# Patient Record
Sex: FEMALE | Race: BLACK OR AFRICAN AMERICAN | NOT HISPANIC OR LATINO | Employment: FULL TIME | URBAN - METROPOLITAN AREA
[De-identification: names, ages, dates, MRNs, and addresses within clinical notes are randomized per-mention and may not be internally consistent; named-entity substitution may affect disease eponyms.]

---

## 2024-05-08 ENCOUNTER — HOSPITAL ENCOUNTER (OUTPATIENT)
Facility: HOSPITAL | Age: 29
Discharge: HOME | End: 2024-05-08
Attending: EMERGENCY MEDICINE | Admitting: EMERGENCY MEDICINE
Payer: MEDICAID

## 2024-05-08 VITALS
TEMPERATURE: 98.1 F | HEIGHT: 67 IN | DIASTOLIC BLOOD PRESSURE: 88 MMHG | BODY MASS INDEX: 26.42 KG/M2 | RESPIRATION RATE: 20 BRPM | HEART RATE: 85 BPM | SYSTOLIC BLOOD PRESSURE: 148 MMHG | WEIGHT: 168.3 LBS | OXYGEN SATURATION: 100 %

## 2024-05-08 DIAGNOSIS — J06.9 VIRAL URI WITH COUGH: Primary | ICD-10-CM

## 2024-05-08 LAB
FLUAV SUBTYP SPEC NAA+PROBE: NOT DETECTED
FLUBV RNA ISLT QL NAA+PROBE: NOT DETECTED
SARS-COV-2 RNA RESP QL NAA+PROBE: NOT DETECTED
STREP A PCR: NOT DETECTED

## 2024-05-08 PROCEDURE — G0463 HOSPITAL OUTPT CLINIC VISIT: HCPCS

## 2024-05-08 PROCEDURE — 87636 SARSCOV2 & INF A&B AMP PRB: CPT | Performed by: EMERGENCY MEDICINE

## 2024-05-08 PROCEDURE — 87651 STREP A DNA AMP PROBE: CPT | Performed by: EMERGENCY MEDICINE

## 2024-05-08 PROCEDURE — 25010000002 DEXAMETHASONE PER 1 MG

## 2024-05-08 RX ORDER — BENZONATATE 100 MG/1
100 CAPSULE ORAL 3 TIMES DAILY PRN
Qty: 12 CAPSULE | Refills: 0 | Status: SHIPPED | OUTPATIENT
Start: 2024-05-08

## 2024-05-08 RX ADMIN — DEXAMETHASONE SODIUM PHOSPHATE 10 MG: 10 INJECTION, SOLUTION INTRAMUSCULAR; INTRAVENOUS at 15:53

## 2025-06-22 ENCOUNTER — HOSPITAL ENCOUNTER (EMERGENCY)
Facility: HOSPITAL | Age: 30
Discharge: HOME | End: 2025-06-23
Attending: EMERGENCY MEDICINE | Admitting: EMERGENCY MEDICINE
Payer: COMMERCIAL

## 2025-06-22 DIAGNOSIS — R10.9 ABDOMINAL PAIN IN PREGNANCY, SECOND TRIMESTER: Primary | ICD-10-CM

## 2025-06-22 DIAGNOSIS — O26.892 ABDOMINAL PAIN IN PREGNANCY, SECOND TRIMESTER: Primary | ICD-10-CM

## 2025-06-22 LAB
ALBUMIN SERPL-MCNC: 3.7 G/DL (ref 3.5–5.2)
ALBUMIN/GLOB SERPL: 1.2 G/DL
ALP SERPL-CCNC: 87 U/L (ref 39–117)
ALT SERPL W P-5'-P-CCNC: 10 U/L (ref 1–33)
ANION GAP SERPL CALCULATED.3IONS-SCNC: 10.8 MMOL/L (ref 5–15)
AST SERPL-CCNC: 12 U/L (ref 1–32)
BASOPHILS # BLD AUTO: 0.01 10*3/MM3 (ref 0–0.2)
BASOPHILS NFR BLD AUTO: 0.1 % (ref 0–1.5)
BILIRUB SERPL-MCNC: 0.2 MG/DL (ref 0–1.2)
BILIRUB UR QL STRIP: NEGATIVE
BUN SERPL-MCNC: 6.7 MG/DL (ref 6–20)
BUN/CREAT SERPL: 12 (ref 7–25)
CALCIUM SPEC-SCNC: 9 MG/DL (ref 8.6–10.5)
CHLORIDE SERPL-SCNC: 102 MMOL/L (ref 98–107)
CLARITY UR: CLEAR
CO2 SERPL-SCNC: 21.2 MMOL/L (ref 22–29)
COLOR UR: YELLOW
CREAT SERPL-MCNC: 0.56 MG/DL (ref 0.57–1)
DEPRECATED RDW RBC AUTO: 44.3 FL (ref 37–54)
EGFRCR SERPLBLD CKD-EPI 2021: 128.5 ML/MIN/1.73
EOSINOPHIL # BLD AUTO: 0.02 10*3/MM3 (ref 0–0.4)
EOSINOPHIL NFR BLD AUTO: 0.2 % (ref 0.3–6.2)
ERYTHROCYTE [DISTWIDTH] IN BLOOD BY AUTOMATED COUNT: 13.5 % (ref 12.3–15.4)
GLOBULIN UR ELPH-MCNC: 3.1 GM/DL
GLUCOSE SERPL-MCNC: 75 MG/DL (ref 65–99)
GLUCOSE UR STRIP-MCNC: NEGATIVE MG/DL
HCT VFR BLD AUTO: 35.5 % (ref 34–46.6)
HGB BLD-MCNC: 11.5 G/DL (ref 12–15.9)
HGB UR QL STRIP.AUTO: NEGATIVE
IMM GRANULOCYTES # BLD AUTO: 0.02 10*3/MM3 (ref 0–0.05)
IMM GRANULOCYTES NFR BLD AUTO: 0.2 % (ref 0–0.5)
KETONES UR QL STRIP: NEGATIVE
LEUKOCYTE ESTERASE UR QL STRIP.AUTO: NEGATIVE
LYMPHOCYTES # BLD AUTO: 1.35 10*3/MM3 (ref 0.7–3.1)
LYMPHOCYTES NFR BLD AUTO: 15.1 % (ref 19.6–45.3)
MCH RBC QN AUTO: 28.6 PG (ref 26.6–33)
MCHC RBC AUTO-ENTMCNC: 32.4 G/DL (ref 31.5–35.7)
MCV RBC AUTO: 88.3 FL (ref 79–97)
MONOCYTES # BLD AUTO: 0.74 10*3/MM3 (ref 0.1–0.9)
MONOCYTES NFR BLD AUTO: 8.3 % (ref 5–12)
NEUTROPHILS NFR BLD AUTO: 6.81 10*3/MM3 (ref 1.7–7)
NEUTROPHILS NFR BLD AUTO: 76.1 % (ref 42.7–76)
NITRITE UR QL STRIP: NEGATIVE
PH UR STRIP.AUTO: 7 [PH] (ref 5–8)
PLATELET # BLD AUTO: 242 10*3/MM3 (ref 140–450)
PMV BLD AUTO: 10.1 FL (ref 6–12)
POTASSIUM SERPL-SCNC: 3.5 MMOL/L (ref 3.5–5.2)
PROT SERPL-MCNC: 6.8 G/DL (ref 6–8.5)
PROT UR QL STRIP: NEGATIVE
RBC # BLD AUTO: 4.02 10*6/MM3 (ref 3.77–5.28)
SODIUM SERPL-SCNC: 134 MMOL/L (ref 136–145)
SP GR UR STRIP: 1.01 (ref 1–1.03)
UROBILINOGEN UR QL STRIP: NORMAL
WBC NRBC COR # BLD AUTO: 8.95 10*3/MM3 (ref 3.4–10.8)

## 2025-06-22 PROCEDURE — 25810000003 SODIUM CHLORIDE 0.9 % SOLUTION: Performed by: EMERGENCY MEDICINE

## 2025-06-22 PROCEDURE — 99284 EMERGENCY DEPT VISIT MOD MDM: CPT | Performed by: EMERGENCY MEDICINE

## 2025-06-22 PROCEDURE — 81003 URINALYSIS AUTO W/O SCOPE: CPT | Performed by: EMERGENCY MEDICINE

## 2025-06-22 PROCEDURE — 85025 COMPLETE CBC W/AUTO DIFF WBC: CPT | Performed by: EMERGENCY MEDICINE

## 2025-06-22 PROCEDURE — 80053 COMPREHEN METABOLIC PANEL: CPT | Performed by: EMERGENCY MEDICINE

## 2025-06-22 PROCEDURE — 99284 EMERGENCY DEPT VISIT MOD MDM: CPT

## 2025-06-22 RX ORDER — ACETAMINOPHEN 325 MG/1
650 TABLET ORAL ONCE
Status: DISCONTINUED | OUTPATIENT
Start: 2025-06-23 | End: 2025-06-23

## 2025-06-22 RX ADMIN — SODIUM CHLORIDE 1000 ML: 9 INJECTION, SOLUTION INTRAVENOUS at 23:45

## 2025-06-23 ENCOUNTER — HOSPITAL ENCOUNTER (OUTPATIENT)
Facility: HOSPITAL | Age: 30
Discharge: HOME | End: 2025-06-23
Attending: OBSTETRICS & GYNECOLOGY | Admitting: STUDENT IN AN ORGANIZED HEALTH CARE EDUCATION/TRAINING PROGRAM
Payer: COMMERCIAL

## 2025-06-23 ENCOUNTER — APPOINTMENT (OUTPATIENT)
Dept: ULTRASOUND IMAGING | Facility: HOSPITAL | Age: 30
End: 2025-06-23
Payer: COMMERCIAL

## 2025-06-23 VITALS
RESPIRATION RATE: 20 BRPM | WEIGHT: 181 LBS | DIASTOLIC BLOOD PRESSURE: 71 MMHG | HEART RATE: 78 BPM | OXYGEN SATURATION: 100 % | BODY MASS INDEX: 28.35 KG/M2 | SYSTOLIC BLOOD PRESSURE: 125 MMHG | TEMPERATURE: 98.2 F

## 2025-06-23 VITALS
HEIGHT: 67 IN | DIASTOLIC BLOOD PRESSURE: 68 MMHG | BODY MASS INDEX: 26.21 KG/M2 | TEMPERATURE: 98.5 F | WEIGHT: 167 LBS | OXYGEN SATURATION: 100 % | RESPIRATION RATE: 19 BRPM | HEART RATE: 107 BPM | SYSTOLIC BLOOD PRESSURE: 104 MMHG

## 2025-06-23 PROBLEM — Z34.90 PREGNANT: Status: ACTIVE | Noted: 2025-06-23

## 2025-06-23 LAB
BILIRUB UR QL STRIP: NEGATIVE
CLARITY UR: CLEAR
COLOR UR: YELLOW
GLUCOSE UR STRIP-MCNC: NEGATIVE MG/DL
HGB UR QL STRIP.AUTO: NEGATIVE
KETONES UR QL STRIP: NEGATIVE
LEUKOCYTE ESTERASE UR QL STRIP.AUTO: NEGATIVE
NITRITE UR QL STRIP: NEGATIVE
PH UR STRIP.AUTO: 6.5 [PH] (ref 5–8)
PROT UR QL STRIP: NEGATIVE
SP GR UR STRIP: 1.01 (ref 1–1.03)
UROBILINOGEN UR QL STRIP: NORMAL

## 2025-06-23 PROCEDURE — 96360 HYDRATION IV INFUSION INIT: CPT

## 2025-06-23 PROCEDURE — 76815 OB US LIMITED FETUS(S): CPT

## 2025-06-23 PROCEDURE — 87086 URINE CULTURE/COLONY COUNT: CPT | Performed by: STUDENT IN AN ORGANIZED HEALTH CARE EDUCATION/TRAINING PROGRAM

## 2025-06-23 PROCEDURE — 81003 URINALYSIS AUTO W/O SCOPE: CPT | Performed by: STUDENT IN AN ORGANIZED HEALTH CARE EDUCATION/TRAINING PROGRAM

## 2025-06-23 PROCEDURE — G0463 HOSPITAL OUTPT CLINIC VISIT: HCPCS

## 2025-06-23 RX ORDER — CYCLOBENZAPRINE HCL 10 MG
5 TABLET ORAL 3 TIMES DAILY
Status: DISCONTINUED | OUTPATIENT
Start: 2025-06-23 | End: 2025-06-23

## 2025-06-23 RX ORDER — ACETAMINOPHEN 500 MG
1000 TABLET ORAL ONCE
Status: COMPLETED | OUTPATIENT
Start: 2025-06-23 | End: 2025-06-23

## 2025-06-23 RX ORDER — PRENATAL VIT NO.126/IRON/FOLIC 28MG-0.8MG
1 TABLET ORAL DAILY
COMMUNITY

## 2025-06-23 RX ORDER — MULTIVIT WITH MINERALS/LUTEIN
250 TABLET ORAL DAILY
COMMUNITY

## 2025-06-23 RX ORDER — CYCLOBENZAPRINE HCL 10 MG
5 TABLET ORAL ONCE
Status: DISCONTINUED | OUTPATIENT
Start: 2025-06-23 | End: 2025-06-23

## 2025-06-23 RX ORDER — CYCLOBENZAPRINE HCL 10 MG
10 TABLET ORAL 3 TIMES DAILY
Status: DISCONTINUED | OUTPATIENT
Start: 2025-06-23 | End: 2025-06-23

## 2025-06-23 RX ADMIN — ACETAMINOPHEN 1000 MG: 500 TABLET, FILM COATED ORAL at 06:11

## 2025-06-23 RX ADMIN — CYCLOBENZAPRINE HYDROCHLORIDE 5 MG: 10 TABLET, FILM COATED ORAL at 07:38

## 2025-06-23 NOTE — DISCHARGE INSTRUCTIONS
Drink plenty of fluids. Take Tylenol for pain. Follow-up with Dr Ybarra this week. Return if problems.

## 2025-06-23 NOTE — FSED PROVIDER NOTE
Subjective   History of Present Illness  29 yof  who 22 weeks a long complains of lower abdominal pain, cramping and back pain that started earlier today. The patient denies bleeding or spotting. The patient denies dysuria, vomiting or diarrhea.       Review of Systems   Constitutional: Negative.    Gastrointestinal:  Positive for abdominal pain.   Genitourinary:  Positive for pelvic pain. Negative for dysuria, vaginal bleeding and vaginal discharge.   Musculoskeletal:  Positive for back pain.   All other systems reviewed and are negative.      History reviewed. No pertinent past medical history.    No Known Allergies    History reviewed. No pertinent surgical history.    History reviewed. No pertinent family history.    Social History     Socioeconomic History    Marital status:            Objective   Physical Exam  Vitals reviewed. Exam conducted with a chaperone present.   Constitutional:       General: She is not in acute distress.  HENT:      Head: Normocephalic and atraumatic.      Mouth/Throat:      Mouth: Mucous membranes are moist.      Pharynx: Oropharynx is clear.   Eyes:      Extraocular Movements: Extraocular movements intact.      Pupils: Pupils are equal, round, and reactive to light.   Cardiovascular:      Rate and Rhythm: Normal rate and regular rhythm.      Heart sounds: Normal heart sounds.   Pulmonary:      Effort: Pulmonary effort is normal.      Breath sounds: Normal breath sounds.   Abdominal:      Tenderness: There is abdominal tenderness.      Comments: gravid   Genitourinary:     Vagina: Normal. No bleeding.      Cervix: No discharge.      Adnexa:         Right: No tenderness.          Left: No tenderness.     Skin:     General: Skin is warm and dry.   Neurological:      Mental Status: She is alert.         Procedures           ED Course  ED Course as of 25 0331   Sun 2025   4851 Pt refused pelvic exam. She reports she does not feel like it is necessary. Explained  to the patient this is a necessary part of the work-up. She continues to decline.  [BM]   Mon Jun 23, 2025   0035 Digital exam performed using sterile gloves, os closed [BM]   0054 Spoke with Dr Jia Wilburn, discussed the case. Pt can follow-up in office, will call patient tomorrow. Rec Tylenol for pain.  [BM]   0104 Pt is feeling better and is agreeable with plan.  [BM]      ED Course User Index  [BM] Lesvia Purdy MD                                           Medical Decision Making  29 yof complains of lower abdominal pain and back pain in pregnancy. Pt denies bleeding or spotting. Pt denies dysuria, vomiting or diarrhea. Pt is in no acute distress in the room and is non toxic. FHT obtained.  Abdominal exam consistent with dates. Pelvic exam performed using sterile gloves, no bleeding, os closed, no abnormal fluid. Discussed the case with on call OB GYN. Rec discharge, will contact patient for follow-up. Return precautions provided.     Problems Addressed:  Abdominal pain in pregnancy, second trimester: complicated acute illness or injury    Amount and/or Complexity of Data Reviewed  Labs: ordered.    Risk  OTC drugs.        Final diagnoses:   Abdominal pain in pregnancy, second trimester       ED Disposition  ED Disposition       ED Disposition   Discharge    Condition   Stable    Comment   --               Katy Ybarra MD  [address] [place] [address] [place] [facility]  [phone]    Schedule an appointment as soon as possible for a visit on 6/23/2025           Medication List      No changes were made to your prescriptions during this visit.

## 2025-06-24 ENCOUNTER — APPOINTMENT (OUTPATIENT)
Dept: ULTRASOUND IMAGING | Facility: HOSPITAL | Age: 30
End: 2025-06-24
Payer: COMMERCIAL

## 2025-06-24 ENCOUNTER — HOSPITAL ENCOUNTER (OUTPATIENT)
Facility: HOSPITAL | Age: 30
Discharge: HOME | End: 2025-06-24
Attending: OBSTETRICS & GYNECOLOGY | Admitting: STUDENT IN AN ORGANIZED HEALTH CARE EDUCATION/TRAINING PROGRAM
Payer: COMMERCIAL

## 2025-06-24 VITALS
HEIGHT: 67 IN | WEIGHT: 177.47 LBS | SYSTOLIC BLOOD PRESSURE: 113 MMHG | RESPIRATION RATE: 18 BRPM | DIASTOLIC BLOOD PRESSURE: 63 MMHG | HEART RATE: 70 BPM | BODY MASS INDEX: 27.85 KG/M2 | TEMPERATURE: 98.1 F | OXYGEN SATURATION: 100 %

## 2025-06-24 DIAGNOSIS — D25.9 UTERINE FIBROID COMPLICATING ANTENATAL CARE, BABY NOT YET DELIVERED IN SECOND TRIMESTER: Primary | ICD-10-CM

## 2025-06-24 DIAGNOSIS — O34.12 UTERINE FIBROID COMPLICATING ANTENATAL CARE, BABY NOT YET DELIVERED IN SECOND TRIMESTER: Primary | ICD-10-CM

## 2025-06-24 DIAGNOSIS — R10.84 GENERALIZED ABDOMINAL PAIN: ICD-10-CM

## 2025-06-24 PROBLEM — R10.9 ABDOMINAL PAIN: Status: ACTIVE | Noted: 2025-06-24

## 2025-06-24 LAB
BACTERIA SPEC AEROBE CULT: NORMAL
BASOPHILS # BLD AUTO: 0.01 10*3/MM3 (ref 0–0.2)
BASOPHILS NFR BLD AUTO: 0.1 % (ref 0–1.5)
DEPRECATED RDW RBC AUTO: 43.8 FL (ref 37–54)
EOSINOPHIL # BLD AUTO: 0.01 10*3/MM3 (ref 0–0.4)
EOSINOPHIL NFR BLD AUTO: 0.1 % (ref 0.3–6.2)
ERYTHROCYTE [DISTWIDTH] IN BLOOD BY AUTOMATED COUNT: 13.4 % (ref 12.3–15.4)
HCT VFR BLD AUTO: 35.3 % (ref 34–46.6)
HGB BLD-MCNC: 11.2 G/DL (ref 12–15.9)
IMM GRANULOCYTES # BLD AUTO: 0.03 10*3/MM3 (ref 0–0.05)
IMM GRANULOCYTES NFR BLD AUTO: 0.4 % (ref 0–0.5)
LYMPHOCYTES # BLD AUTO: 1.1 10*3/MM3 (ref 0.7–3.1)
LYMPHOCYTES NFR BLD AUTO: 12.9 % (ref 19.6–45.3)
MCH RBC QN AUTO: 28 PG (ref 26.6–33)
MCHC RBC AUTO-ENTMCNC: 31.7 G/DL (ref 31.5–35.7)
MCV RBC AUTO: 88.3 FL (ref 79–97)
MONOCYTES # BLD AUTO: 0.9 10*3/MM3 (ref 0.1–0.9)
MONOCYTES NFR BLD AUTO: 10.6 % (ref 5–12)
NEUTROPHILS NFR BLD AUTO: 6.45 10*3/MM3 (ref 1.7–7)
NEUTROPHILS NFR BLD AUTO: 75.9 % (ref 42.7–76)
NRBC BLD AUTO-RTO: 0 /100 WBC (ref 0–0.2)
PLATELET # BLD AUTO: 243 10*3/MM3 (ref 140–450)
PMV BLD AUTO: 11.2 FL (ref 6–12)
RBC # BLD AUTO: 4 10*6/MM3 (ref 3.77–5.28)
WBC NRBC COR # BLD AUTO: 8.5 10*3/MM3 (ref 3.4–10.8)

## 2025-06-24 PROCEDURE — 76775 US EXAM ABDO BACK WALL LIM: CPT

## 2025-06-24 PROCEDURE — 25810000003 LACTATED RINGERS SOLUTION: Performed by: STUDENT IN AN ORGANIZED HEALTH CARE EDUCATION/TRAINING PROGRAM

## 2025-06-24 PROCEDURE — 85025 COMPLETE CBC W/AUTO DIFF WBC: CPT | Performed by: OBSTETRICS & GYNECOLOGY

## 2025-06-24 PROCEDURE — G0463 HOSPITAL OUTPT CLINIC VISIT: HCPCS

## 2025-06-24 RX ORDER — ACETAMINOPHEN 500 MG
1000 TABLET ORAL ONCE
Status: COMPLETED | OUTPATIENT
Start: 2025-06-24 | End: 2025-06-24

## 2025-06-24 RX ORDER — OXYCODONE HYDROCHLORIDE 5 MG/1
5 TABLET ORAL EVERY 12 HOURS PRN
Qty: 12 TABLET | Refills: 0 | Status: SHIPPED | OUTPATIENT
Start: 2025-06-24

## 2025-06-24 RX ORDER — OXYCODONE HYDROCHLORIDE 5 MG/1
5 TABLET ORAL ONCE
Refills: 0 | Status: COMPLETED | OUTPATIENT
Start: 2025-06-24 | End: 2025-06-24

## 2025-06-24 RX ORDER — POLYETHYLENE GLYCOL 3350 17 G/17G
17 POWDER, FOR SOLUTION ORAL DAILY
Status: DISCONTINUED | OUTPATIENT
Start: 2025-06-24 | End: 2025-06-24 | Stop reason: HOSPADM

## 2025-06-24 RX ORDER — SODIUM CHLORIDE 0.9 % (FLUSH) 0.9 %
10 SYRINGE (ML) INJECTION EVERY 12 HOURS SCHEDULED
Status: DISCONTINUED | OUTPATIENT
Start: 2025-06-24 | End: 2025-06-24 | Stop reason: HOSPADM

## 2025-06-24 RX ORDER — CYCLOBENZAPRINE HCL 10 MG
10 TABLET ORAL ONCE
Status: COMPLETED | OUTPATIENT
Start: 2025-06-24 | End: 2025-06-24

## 2025-06-24 RX ORDER — SODIUM CHLORIDE 0.9 % (FLUSH) 0.9 %
10 SYRINGE (ML) INJECTION AS NEEDED
Status: DISCONTINUED | OUTPATIENT
Start: 2025-06-24 | End: 2025-06-24 | Stop reason: HOSPADM

## 2025-06-24 RX ORDER — CYCLOBENZAPRINE HCL 10 MG
5 TABLET ORAL ONCE
Status: COMPLETED | OUTPATIENT
Start: 2025-06-24 | End: 2025-06-24

## 2025-06-24 RX ORDER — SIMETHICONE 80 MG
80 TABLET,CHEWABLE ORAL 4 TIMES DAILY PRN
Status: DISCONTINUED | OUTPATIENT
Start: 2025-06-24 | End: 2025-06-24 | Stop reason: HOSPADM

## 2025-06-24 RX ADMIN — CYCLOBENZAPRINE HYDROCHLORIDE 5 MG: 10 TABLET, FILM COATED ORAL at 03:32

## 2025-06-24 RX ADMIN — SIMETHICONE 80 MG: 80 TABLET, CHEWABLE ORAL at 15:19

## 2025-06-24 RX ADMIN — CYCLOBENZAPRINE HYDROCHLORIDE 10 MG: 10 TABLET, FILM COATED ORAL at 15:19

## 2025-06-24 RX ADMIN — ACETAMINOPHEN 1000 MG: 500 TABLET ORAL at 03:33

## 2025-06-24 RX ADMIN — SODIUM CHLORIDE, POTASSIUM CHLORIDE, SODIUM LACTATE AND CALCIUM CHLORIDE 1000 ML: 600; 310; 30; 20 INJECTION, SOLUTION INTRAVENOUS at 03:48

## 2025-06-24 RX ADMIN — POLYETHYLENE GLYCOL 3350 17 G: 17 POWDER, FOR SOLUTION ORAL at 15:18

## 2025-06-24 RX ADMIN — OXYCODONE 5 MG: 5 TABLET ORAL at 17:26

## 2025-06-24 NOTE — DISCHARGE SUMMARY
Date of Discharge:  2025    Presenting Problem/History of Present Illness:  29 y.o. female  currently at 22w6d  Abdominal pain      Discharge Diagnosis: same      Hospital Course:  Patient is a 29 y.o. female  currently at 22w6d, presented with abdominal pain since .  Pt states she has had this pain off and on since Saturday, is generally painful in her low abdomen but specifically worse painful in her LLQ.  She reports good FM, no vaginal bleeding, doesn't think she has contractions but pain does come and go every few minutes.  Last BM was on Saturday.  No fevers, some low abdominal pain with voiding as well.    She was initially seen for this problem on  and UA was normal with negative culture, CMP was wnl, CBC wnl with WBC 8.9 Hgb 11.5.  Ultrasound showed greenberg IUP c anterior placenta without abnormality and 7.5cm L uterine fibroid.  She was given flexeril and stable for d/c to home.  She returned today stating she didn't get any relief with the flexeril and is still having the pain.  She again reports good FM, no VB.  Hasn't had a BM since Saturday.  Fetal monitoring was appropriate for gestational age without evidence of contractions.     During her stay her examination was consistent with tenderness on her Left upper uterus where her fibroid is.  I suspect her pain is mainly due to her fibroid with uterine enlargement causing pain in that area.  Also the colicky pain may be GI in origin.  She was given Miralax/ and Simethicone.    She was also given Flexeril for the musculoskeletal component, 10mg today as didn't get relief yesterday c 5mg.  She did not have any relief with that or the Simethicone.  She then was given Roxicodone 5mg.      She was feeling better and stable for discharge by the afternoon of .  Return s/sx were reviewed.  Pt to f/u next 1-2 wks  in the office or sooner prn.     Pertinent Test Results:     Collected Updated Procedure Result Status     06/24/2025 1518 06/24/2025 1612 CBC Auto Differential [[id number]]   (Abnormal)   Blood    Final result Component Value Units   WBC 8.50 10*3/mm3   RBC 4.00 10*6/mm3   Hemoglobin 11.2 Low  g/dL   Hematocrit 35.3 %   MCV 88.3 fL   MCH 28.0 pg   MCHC 31.7 g/dL   RDW 13.4 %   RDW-SD 43.8 fl   MPV 11.2 fL   Platelets 243 10*3/mm3   Neutrophil % 75.9 %   Lymphocyte % 12.9 Low  %   Monocyte % 10.6 %   Eosinophil % 0.1 Low  %   Basophil % 0.1 %   Immature Grans % 0.4 %   Neutrophils, Absolute 6.45 10*3/mm3   Lymphocytes, Absolute 1.10 10*3/mm3   Monocytes, Absolute 0.90 10*3/mm3   Eosinophils, Absolute 0.01 10*3/mm3   Basophils, Absolute 0.01 10*3/mm3   Immature Grans, Absolute 0.03 10*3/mm3   nRBC 0.0 /100 WBC          06/22/2025 2319 06/22/2025 2324 CBC Auto Differential [[id number]]   (Abnormal)   Blood    Final result Component Value Units   WBC 8.95 10*3/mm3   RBC 4.02 10*6/mm3   Hemoglobin 11.5 Low  g/dL   Hematocrit 35.5 %   MCV 88.3 fL   MCH 28.6 pg   MCHC 32.4 g/dL   RDW 13.5 %   RDW-SD 44.3 fl   MPV 10.1 fL   Platelets 242 10*3/mm3   Neutrophil % 76.1 High  %   Lymphocyte % 15.1 Low  %   Monocyte % 8.3 %   Eosinophil % 0.2 Low  %   Basophil % 0.1 %   Immature Grans % 0.2 %   Neutrophils, Absolute 6.81 10*3/mm3   Lymphocytes, Absolute 1.35 10*3/mm3   Monocytes, Absolute 0.74 10*3/mm3   Eosinophils, Absolute 0.02 10*3/mm3   Basophils, Absolute 0.01 10*3/mm3   Immature Grans, Absolute 0.02         06/22/2025 2319 06/22/2025 2346 Comprehensive Metabolic Panel [[id number]]    (Abnormal)   Blood    Final result Component Value Units   Glucose 75 mg/dL   BUN 6.7 mg/dL   Creatinine 0.56 Low  mg/dL   Sodium 134 Low  mmol/L   Potassium 3.5 mmol/L   Chloride 102 mmol/L   CO2 21.2 Low  mmol/L   Calcium 9.0 mg/dL   Total Protein 6.8 g/dL   Albumin 3.7 g/dL   ALT (SGPT) 10 U/L   AST (SGOT) 12 U/L   Alkaline Phosphatase 87 U/L   Total Bilirubin 0.2 mg/dL   Globulin 3.1 gm/dL   A/G Ratio 1.2 g/dL   BUN/Creatinine Ratio 12.0   "  Anion Gap 10.8 mmol/L   eGFR 128.5 mL/min/1.73        06/23/2025 0545 06/23/2025 0602 Urinalysis With Culture If Indicated - Urine, Clean Catch [[id number]]    Urine, Clean Catch    Final result Component Value   Color, UA Yellow   Appearance, UA Clear   pH, UA 6.5   Specific Gravity, UA 1.008   Glucose, UA Negative   Ketones, UA Negative   Bilirubin, UA Negative   Blood, UA Negative   Protein, UA Negative   Leuk Esterase, UA Negative   Nitrite, UA Negative   Urobilinogen, UA 0.2 E.U./dL          06/23/2025 0545 06/24/2025 0910 Urine Culture - Urine, Urine, Clean Catch [[id number]]    Urine, Clean Catch    Final result                US OB LIMITED 1 + FETUSES    Date of Exam: 6/23/2025 9:27 AM EDT    Indication: look at placenta and fibroid and left side, MELODY 10/22/2025    Comparison: No comparisons available.    Technique: A limited pregnancy ultrasound was performed with real time scanning.  Image documentation was obtained per protocol.    Findings:  Single intrauterine fetus in breech position. Fetal anatomy was not evaluated on this targeted exam. Fetal heart rate detected at 162 bpm. Placenta is located anteriorly without evidence of previa. Exophytic hypoechoic mass arising from the left side of  the uterus measures 7.5 x 5.3 x 6.4 cm.   Impression:     Impression:  1.Single intrauterine fetus in breech position.  2.Anterior placenta without evidence of previa.  3.7.5 cm hypoechoic mass arising from the left side of the uterus, which may be a subserosal fibroid. No previous imaging available for comparison.        Electronically Signed: Rosa Sutherland MD   6/23/2025 10:00 AM EDT   Workstation ID: [id number]         Condition on Discharge:  Good    Vital Signs:  Vitals:    06/24/25 0250 06/24/25 0324   BP:  113/63   Pulse:  70   Resp:  18   Temp:  98.1 °F (36.7 °C)   TempSrc:  Oral   SpO2:  100%   Weight: 80.5 kg (177 lb 7.5 oz)    Height: 170.2 cm (67\")        Physical Exam:     General Appearance:    Alert, " cooperative, in no acute distress       Abdomen:    Soft, diffusely tender but more tender in Left upper uterus/ lateral to umbilicus over area of palpable fibroid.      Pelvic Exam:    was checked (by me): closed cm / thick % / Floating   Extremities:    Fetal Assessment:   Moves all extremities well, no edema, no cyanosis, no             redness   +FHTs appropriate for gestational age [place]: initially irritable which resolved during observation       Discharge Disposition:  Home    Discharge Medications:     Discharge Medications        ASK your doctor about these medications        Instructions Start Date   prenatal (CLASSIC) vitamin 28-0.8 MG tablet tablet  Generic drug: prenatal vitamin   1 tablet, Daily      vitamin C 250 MG tablet  Commonly known as: ASCORBIC ACID   250 mg, Daily               Follow-up Appointments  Follow-up appointment with Dr. Katy Ybarra in 2 weeks       Total time for care for this pt exceeds 85 minutes with evaluation/ examination/ follow up of labs/ re-evaluation of patient/ ordering tests/ medications and documentation of care.      Katy Ybarra MD  06/24/25  15:55 EDT

## 2025-06-24 NOTE — NURSING NOTE
"Pt presents to triage with complaints of severe abdominal pain rating 8/10 in her lower abdomen.  Abdomen palpates soft, but is extremely tender on maternal left side of abdomen.  Pt denies bleeding and leaking.  Pt states positive fetal movement.  Pt describes pain as \"sore\".  Dr. Nava notified of patient arrival, orders received.  "

## 2025-06-24 NOTE — LETTER
June 24, 2025     Patient: Jada Cote   YOB: 1995   Date of Visit: 6/23/2025       To Whom It May Concern:    Pt was seen at hospital.  Significant other was at bedside with patient the entire duration.           Sincerely,        No name on file    CC: No Recipients

## 2025-06-24 NOTE — NURSING NOTE
Patient reported getting no relief from symptoms after fluid bolus, flexeril, and tylenol. Patient given oxicodone and miralax since patient reports not having a bowel movement since 6/21. Renal u/s performed and was unremarkable. Patient reports oxycodone helped with back pain, and patient was instructed to wear abdominal binder below abdomen to help support abdomen, and continue miralax at home. Patient being discharged in stable condition.